# Patient Record
Sex: MALE | Race: WHITE | NOT HISPANIC OR LATINO | Employment: STUDENT | URBAN - METROPOLITAN AREA
[De-identification: names, ages, dates, MRNs, and addresses within clinical notes are randomized per-mention and may not be internally consistent; named-entity substitution may affect disease eponyms.]

---

## 2023-06-08 ENCOUNTER — APPOINTMENT (OUTPATIENT)
Dept: PRIMARY CARE | Facility: CLINIC | Age: 23
End: 2023-06-08

## 2023-06-12 ENCOUNTER — OFFICE VISIT (OUTPATIENT)
Dept: PRIMARY CARE | Facility: CLINIC | Age: 23
End: 2023-06-12
Payer: COMMERCIAL

## 2023-06-12 VITALS — WEIGHT: 205.5 LBS | DIASTOLIC BLOOD PRESSURE: 65 MMHG | SYSTOLIC BLOOD PRESSURE: 105 MMHG

## 2023-06-12 DIAGNOSIS — M51.9 LUMBAR DISC DISEASE: ICD-10-CM

## 2023-06-12 DIAGNOSIS — Z00.00 HEALTH CARE MAINTENANCE: Primary | ICD-10-CM

## 2023-06-12 PROCEDURE — 99395 PREV VISIT EST AGE 18-39: CPT | Performed by: INTERNAL MEDICINE

## 2023-06-18 NOTE — PROGRESS NOTES
Subjective   Patient ID: Toro Abbasi III is a 22 y.o. male who presents for No chief complaint on file..    HPI CPE see updated front sheet no chest pain no shortness of breath finishing his masters [employer] to working from home moving out doing well overall no regular medication bowels normal no dysuria allergies have been okay low back occasionally flares with his disc disease but not disabling still able to play golf    Past medical history lumbar disc disease    Medications none noted at this time    Allergies noted and unchanged    Social history no tobacco alcohol social    Family history noted and unchanged    Prevention exercises at least 2-3 times per week no recent previous blood work has had coronavirus infection at least 3 times per him no recent vaccines    Review of Systems    Objective   /65   Wt 93.2 kg (205 lb 8 oz)     Physical Exam vital signs noted alert and oriented x3 NCAT PERRLA EOMI nares without discharge OP benign TM normal bilateral EAC clear bilateral no AC nodes no JVD or bruit no thyromegaly chest clear to auscultation and percussion CV regular rate and rhythm S1-S2 without murmur gallop or rub abdomen soft nontender normal active bowel sounds no rebound or guarding no HSM LS spine normal to straighten curvature lumbar some tightness to the right posterior paraspinal muscle negative straight leg raise negative logroll negative SI joint tenderness extremities no clubbing cyanosis or edema normal distal pulses DTR 2+    Assessment/Plan    impression General medical examination lumbar disc disease  Plan back hygiene back stretches good hydration heating pad when needed occasional analgesia back stretches after playing golf no longer plays as much baseball no blood work desired or needed care and safety in the home in the home good overall diet continued exercise increase water consumption recheck at any time and for next follow-up visit TT 50 cc 26

## 2024-01-17 ENCOUNTER — OFFICE VISIT (OUTPATIENT)
Dept: PRIMARY CARE | Facility: CLINIC | Age: 24
End: 2024-01-17
Payer: COMMERCIAL

## 2024-01-17 VITALS — SYSTOLIC BLOOD PRESSURE: 125 MMHG | WEIGHT: 210.5 LBS | DIASTOLIC BLOOD PRESSURE: 68 MMHG

## 2024-01-17 DIAGNOSIS — M54.50 BILATERAL LOW BACK PAIN WITHOUT SCIATICA, UNSPECIFIED CHRONICITY: Primary | ICD-10-CM

## 2024-01-17 DIAGNOSIS — Z00.00 HEALTH CARE MAINTENANCE: ICD-10-CM

## 2024-01-17 DIAGNOSIS — M54.50 LOW BACK PAIN, UNSPECIFIED BACK PAIN LATERALITY, UNSPECIFIED CHRONICITY, UNSPECIFIED WHETHER SCIATICA PRESENT: ICD-10-CM

## 2024-01-17 PROCEDURE — 99395 PREV VISIT EST AGE 18-39: CPT | Performed by: INTERNAL MEDICINE

## 2024-01-17 NOTE — PROGRESS NOTES
Subjective   Patient ID: Toro Abbasi III is a 23 y.o. male who presents for No chief complaint on file..    HPI CPE and/or follow-up see updated front sheet after hours no staff no chest pain no shortness of breath has had some longer standing low back pain is sometimes will have for the goes numb at the top supplements thighs and even in the genital area that after a brief moment he will get all sensation back and will have a burning sensation has had no motor issues no falls no new injuries    Sitting perhaps doing some or exercises that uses his lower back and legs sinuses been okay no dysuria no incontinence bowels normal    History of back pain    Medications noted and unchanged none at the current time    Allergies noted and unchanged    Social history no tobacco alcohol rare social    Family history noted and unchanged    Prevention Works out regularly no recent evaluations no flu shot    Review of Systems    Objective   There were no vitals taken for this visit.    Physical Exam  Vital signs noted alert and oriented x 3 NCAT PERRLA EOMI nares without discharge OP benign TM normal bilateral EAC clear bilateral no AC nodes no JVD or bruit no thyromegaly chest clear to auscultation and percussion CV regular rate and rhythm S1-S2 LS-spine normal curvature nontender spinous process negative straight leg raise negative logroll negative SI joint tenderness extremities no clubbing cyanosis or edema normal distal pulses DTR 2+ on the left patella and 1+ on the right patella  Assessment/Plan    impression General medical examination if needed did not want to repeat any blood work  Plan will obtain LS-spine film AP lateral requisition made    Next current time may follow-up with orthopedics or surgery after advanced imaging or physical therapy if there are any other issues that come to report for immediate evaluation good hydration back hygiene back stretches avoiding gymnasium workouts with heavy spine compromise  such as dead lifting or overhead press and will recheck based on above TT 50 cc 26

## 2024-01-20 ENCOUNTER — HOSPITAL ENCOUNTER (OUTPATIENT)
Dept: RADIOLOGY | Facility: HOSPITAL | Age: 24
Discharge: HOME | End: 2024-01-20
Payer: COMMERCIAL

## 2024-01-20 DIAGNOSIS — M54.50 BILATERAL LOW BACK PAIN WITHOUT SCIATICA, UNSPECIFIED CHRONICITY: ICD-10-CM

## 2024-01-20 PROCEDURE — 72110 X-RAY EXAM L-2 SPINE 4/>VWS: CPT

## 2024-01-20 PROCEDURE — 72110 X-RAY EXAM L-2 SPINE 4/>VWS: CPT | Performed by: STUDENT IN AN ORGANIZED HEALTH CARE EDUCATION/TRAINING PROGRAM

## 2024-01-24 ENCOUNTER — TELEPHONE (OUTPATIENT)
Dept: PRIMARY CARE | Facility: CLINIC | Age: 24
End: 2024-01-24

## 2024-02-04 DIAGNOSIS — M54.50 LOW BACK PAIN, UNSPECIFIED BACK PAIN LATERALITY, UNSPECIFIED CHRONICITY, UNSPECIFIED WHETHER SCIATICA PRESENT: Primary | ICD-10-CM

## 2024-02-04 DIAGNOSIS — M51.9 LUMBAR DISC DISEASE: ICD-10-CM

## 2024-02-04 DIAGNOSIS — M54.50 BILATERAL LOW BACK PAIN WITHOUT SCIATICA, UNSPECIFIED CHRONICITY: ICD-10-CM

## 2024-02-19 ENCOUNTER — TRANSCRIBE ORDERS (OUTPATIENT)
Dept: ORTHOPEDIC SURGERY | Facility: HOSPITAL | Age: 24
End: 2024-02-19
Payer: COMMERCIAL

## 2024-02-19 DIAGNOSIS — M54.50 LOW BACK PAIN, UNSPECIFIED BACK PAIN LATERALITY, UNSPECIFIED CHRONICITY, UNSPECIFIED WHETHER SCIATICA PRESENT: ICD-10-CM

## 2024-02-20 ENCOUNTER — OFFICE VISIT (OUTPATIENT)
Dept: ORTHOPEDIC SURGERY | Facility: CLINIC | Age: 24
End: 2024-02-20
Payer: COMMERCIAL

## 2024-02-20 ENCOUNTER — APPOINTMENT (OUTPATIENT)
Dept: RADIOLOGY | Facility: CLINIC | Age: 24
End: 2024-02-20
Payer: COMMERCIAL

## 2024-02-20 VITALS — HEIGHT: 75 IN | BODY MASS INDEX: 26.11 KG/M2 | WEIGHT: 210 LBS

## 2024-02-20 DIAGNOSIS — M54.16 LUMBAR RADICULOPATHY: Primary | ICD-10-CM

## 2024-02-20 DIAGNOSIS — M51.36 DDD (DEGENERATIVE DISC DISEASE), LUMBAR: ICD-10-CM

## 2024-02-20 PROCEDURE — 99203 OFFICE O/P NEW LOW 30 MIN: CPT | Performed by: ORTHOPAEDIC SURGERY

## 2024-02-20 PROCEDURE — 99213 OFFICE O/P EST LOW 20 MIN: CPT | Performed by: ORTHOPAEDIC SURGERY

## 2024-02-20 ASSESSMENT — PAIN - FUNCTIONAL ASSESSMENT: PAIN_FUNCTIONAL_ASSESSMENT: NO/DENIES PAIN

## 2024-02-20 NOTE — LETTER
February 20, 2024     Niko Collins MD  [address]  [facility], [facility] [address]  [facility] [address]    Patient: Toro Abbasi III   YOB: 2000   Date of Visit: 2/20/2024       Dear Dr. Niko Collins MD:    Thank you for referring Toro Abbasi to me for evaluation. Below are my notes for this consultation.  If you have questions, please do not hesitate to call me. I look forward to following your patient along with you.       Sincerely,     Rob Sullivan MD      CC: No Recipients  ______________________________________________________________________________________    HPI:oTro Abbasi III is a 23-year-old man, who comes in today, with complaints of some intermittent numbness in his legs.  He notices that when he is sitting for prolonged period of time, i.e. when he is using the restroom, he will get some numbness into his legs bilaterally.  Patient has no radicular pain.  He is otherwise asymptomatic throughout the day.  He exercises on a regular basis with no significant pain.      ROS:  Reviewed on EMR and patient intake sheet.    PMH/SH:  Reviewed on EMR and patient intake sheet.    Exam:  Physical Exam    Constitutional: Well appearing; no acute distress  Eyes: pupils are equal and round  Psych: normal affect  Respiratory: non-labored breathing  Cardiovascular: regular rate and rhythm  GI: non-distended abdomen  Musculoskeletal: no pain with range of motion of the hips bilaterally  Neurologic: [5]/5 strength in the lower extremities bilaterally]; [negative] straight leg raise; no clonus; negative babinski    Radiology:     MRI from 2019 was personally reviewed.  At that time he had a degenerative L5-S1 disc bulge with a left paracentral component touching the traversing S1 nerve root.  Otherwise no spinal stenosis.    Diagnosis:    Lumbar radiculopathy    Assessment and Plan:   23-year-old man with intermittent numbness and tingling into his legs which may actually be  related to mechanical compression of the sciatic nerve in a prolonged sitting position.  Alternatively, he could be getting some intermittent tingling from his L5-S1 disc.  In either case, the patient is otherwise asymptomatic.  He is exercising and doing activities as tolerated.  No further treatment required at this time.  However, should he develop radicular pain in the leg, then I be happy to see him back at that time.  He was appreciative for the conservative nonoperative approach and will follow-up as necessary.    The patient was in agreement with the plan. At the end of the visit today, the patient felt that all questions had been answered satisfactorily.  The patient was pleased with the visit and very appreciative for the care rendered.     Thank you very much for the kind referral.  It is a privilege, and a pleasure, to partner with you in the care of your patients.  I would be delighted to assist you with any further consultations as needed.          Rob Sullivan MD    Chief of Spine Surgery, [facility]  Director of Spine Service, [facility]  , Department of Orthopaedics  [facility] [employer] [facility]  [address] [place] [address]  P: [phone]    This note was dictated with voice recognition software.  It has not been proofread for grammatical errors, typographical mistakes or other semantic inconsistencies.

## 2024-02-20 NOTE — PROGRESS NOTES
HPI:Toro Abbasi III is a 23-year-old man, who comes in today, with complaints of some intermittent numbness in his legs.  He notices that when he is sitting for prolonged period of time, i.e. when he is using the restroom, he will get some numbness into his legs bilaterally.  Patient has no radicular pain.  He is otherwise asymptomatic throughout the day.  He exercises on a regular basis with no significant pain.      ROS:  Reviewed on EMR and patient intake sheet.    PMH/SH:  Reviewed on EMR and patient intake sheet.    Exam:  Physical Exam    Constitutional: Well appearing; no acute distress  Eyes: pupils are equal and round  Psych: normal affect  Respiratory: non-labored breathing  Cardiovascular: regular rate and rhythm  GI: non-distended abdomen  Musculoskeletal: no pain with range of motion of the hips bilaterally  Neurologic: [5]/5 strength in the lower extremities bilaterally]; [negative] straight leg raise; no clonus; negative babinski    Radiology:     MRI from 2019 was personally reviewed.  At that time he had a degenerative L5-S1 disc bulge with a left paracentral component touching the traversing S1 nerve root.  Otherwise no spinal stenosis.    Diagnosis:    Lumbar radiculopathy    Assessment and Plan:   23-year-old man with intermittent numbness and tingling into his legs which may actually be related to mechanical compression of the sciatic nerve in a prolonged sitting position.  Alternatively, he could be getting some intermittent tingling from his L5-S1 disc.  In either case, the patient is otherwise asymptomatic.  He is exercising and doing activities as tolerated.  No further treatment required at this time.  However, should he develop radicular pain in the leg, then I be happy to see him back at that time.  He was appreciative for the conservative nonoperative approach and will follow-up as necessary.    The patient was in agreement with the plan. At the end of the visit today, the patient  felt that all questions had been answered satisfactorily.  The patient was pleased with the visit and very appreciative for the care rendered.     Thank you very much for the kind referral.  It is a privilege, and a pleasure, to partner with you in the care of your patients.  I would be delighted to assist you with any further consultations as needed.          Rob Sullivan MD    Chief of Spine Surgery, [facility]  Director of Spine Service, [facility]  , Department of Orthopaedics  [facility] [employer]  [address] [place] [address]  P: [phone]    This note was dictated with voice recognition software.  It has not been proofread for grammatical errors, typographical mistakes or other semantic inconsistencies.

## 2024-07-15 ENCOUNTER — OFFICE VISIT (OUTPATIENT)
Dept: PRIMARY CARE | Facility: CLINIC | Age: 24
End: 2024-07-15
Payer: COMMERCIAL

## 2024-07-15 ENCOUNTER — LAB (OUTPATIENT)
Dept: LAB | Facility: LAB | Age: 24
End: 2024-07-15
Payer: COMMERCIAL

## 2024-07-15 VITALS — HEART RATE: 76 BPM | RESPIRATION RATE: 12 BRPM

## 2024-07-15 DIAGNOSIS — B37.49 CANDIDIASIS OF GENITALIA: ICD-10-CM

## 2024-07-15 DIAGNOSIS — R30.0 DYSURIA: Primary | ICD-10-CM

## 2024-07-15 DIAGNOSIS — R30.0 DYSURIA: ICD-10-CM

## 2024-07-15 LAB
APPEARANCE UR: CLEAR
BILIRUB UR STRIP.AUTO-MCNC: NEGATIVE MG/DL
COLOR UR: YELLOW
GLUCOSE UR STRIP.AUTO-MCNC: NORMAL MG/DL
KETONES UR STRIP.AUTO-MCNC: NEGATIVE MG/DL
LEUKOCYTE ESTERASE UR QL STRIP.AUTO: NEGATIVE
NITRITE UR QL STRIP.AUTO: NEGATIVE
PH UR STRIP.AUTO: 6.5 [PH]
PROT UR STRIP.AUTO-MCNC: NEGATIVE MG/DL
RBC # UR STRIP.AUTO: NEGATIVE /UL
SP GR UR STRIP.AUTO: 1.02
UROBILINOGEN UR STRIP.AUTO-MCNC: NORMAL MG/DL

## 2024-07-15 PROCEDURE — 81003 URINALYSIS AUTO W/O SCOPE: CPT

## 2024-07-15 PROCEDURE — 99213 OFFICE O/P EST LOW 20 MIN: CPT | Performed by: INTERNAL MEDICINE

## 2024-07-15 NOTE — PROGRESS NOTES
Subjective   Patient ID: Toro Abbasi III is a 23 y.o. male who presents for No chief complaint on file..    HPI   Follow-up visit and sick visit no chest pain no shortness of breath had some discomfort after intercourse no bruising there was no discharge has been monogamous with a single person and sometimes uses protection has had HPV per him vaccination completed no dysuria no abdominal discomfort no fever  Review of Systems    Objective   There were no vitals taken for this visit.  Vital signs noted alert and oriented x 3 NCAT no JVD chest clear to auscultation CV regular rate and rhythm S1-S2 abdomen soft nontender normal active bowel sounds no inguinal hernia no adenopathy normal penis no ecchymoses normal descended testicles extremities no clubbing cyanosis or edema normal distal pulses  Physical Exam    Assessment/Plan     Impression penile discomfort versus dysuria versus other versus mild Candida  Plan okay for Lotrimin externally x 3 days okay for urinalysis requisition made discussed with protection and position as part of the intercourse experience good overall diet regular exercise good water consumption recheck if any other issues

## 2024-11-26 ENCOUNTER — APPOINTMENT (OUTPATIENT)
Dept: PRIMARY CARE | Facility: CLINIC | Age: 24
End: 2024-11-26
Payer: COMMERCIAL

## 2024-11-26 VITALS — SYSTOLIC BLOOD PRESSURE: 109 MMHG | WEIGHT: 214 LBS | DIASTOLIC BLOOD PRESSURE: 68 MMHG | BODY MASS INDEX: 26.75 KG/M2

## 2024-11-26 DIAGNOSIS — R00.2 PALPITATIONS: ICD-10-CM

## 2024-11-26 DIAGNOSIS — Z00.00 HEALTH CARE MAINTENANCE: Primary | ICD-10-CM

## 2024-11-26 PROCEDURE — 99395 PREV VISIT EST AGE 18-39: CPT | Performed by: INTERNAL MEDICINE

## 2024-11-26 NOTE — PROGRESS NOTES
Subjective   Patient ID: Toro Abbasi III is a 24 y.o. male who presents for No chief complaint on file..    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    Physical Exam  Vital signs noted alert and oriented x 3 NCAT PERRLA EOMI nares without discharge OP benign TM normal bilateral EAC clear bilateral no AC nodes no JVD no thyromegaly chest clear to auscultation CV regular rate and rhythm S1-S2 without murmur gallop or rub extremities no clubbing cyanosis or edema normal distal pulses DTR 2+ no tremor  Assessment/Plan    impression General Medical examination palpitation versus anxiety other diagnoses  Plan check Chem-7 advised on glucose potassium and kidney function check CBC advised on blood count check lipid panel advised on cholesterol profile good diet regular exercise good water consumption limit caffeine or sip throughout the day rather than all at once good sleep habits he desires no medications but will look at his results and then recheck and may be addressed anxiety issue if becomes more apparent or there is any other physical changes recheck at any time TT 50 cc 26

## 2025-07-23 ENCOUNTER — TELEPHONE (OUTPATIENT)
Dept: PRIMARY CARE | Facility: CLINIC | Age: 25
End: 2025-07-23

## 2025-08-26 ENCOUNTER — APPOINTMENT (OUTPATIENT)
Dept: CARDIOLOGY | Facility: CLINIC | Age: 25
End: 2025-08-26
Payer: COMMERCIAL

## 2025-08-28 ENCOUNTER — OFFICE VISIT (OUTPATIENT)
Dept: CARDIOLOGY | Facility: CLINIC | Age: 25
End: 2025-08-28
Payer: COMMERCIAL

## 2025-08-28 VITALS
HEIGHT: 75 IN | SYSTOLIC BLOOD PRESSURE: 128 MMHG | WEIGHT: 225 LBS | OXYGEN SATURATION: 96 % | DIASTOLIC BLOOD PRESSURE: 73 MMHG | HEART RATE: 58 BPM | BODY MASS INDEX: 27.98 KG/M2

## 2025-08-28 DIAGNOSIS — R07.89 OTHER CHEST PAIN: Primary | ICD-10-CM

## 2025-08-28 DIAGNOSIS — R00.2 PALPITATIONS: ICD-10-CM

## 2025-08-28 PROCEDURE — 3008F BODY MASS INDEX DOCD: CPT

## 2025-08-28 PROCEDURE — 99202 OFFICE O/P NEW SF 15 MIN: CPT

## 2025-08-28 PROCEDURE — 1036F TOBACCO NON-USER: CPT

## 2025-08-28 PROCEDURE — 99204 OFFICE O/P NEW MOD 45 MIN: CPT

## 2025-08-28 RX ORDER — CETIRIZINE HYDROCHLORIDE 10 MG/1
TABLET, CHEWABLE ORAL DAILY
COMMUNITY

## 2025-08-28 ASSESSMENT — ENCOUNTER SYMPTOMS: BACK PAIN: 1

## 2025-08-29 LAB — TSH SERPL-ACNC: 1.3 MIU/L (ref 0.4–4.5)

## 2025-09-17 ENCOUNTER — APPOINTMENT (OUTPATIENT)
Dept: CARDIOLOGY | Facility: CLINIC | Age: 25
End: 2025-09-17
Payer: COMMERCIAL

## 2025-09-19 ENCOUNTER — APPOINTMENT (OUTPATIENT)
Dept: CARDIOLOGY | Facility: CLINIC | Age: 25
End: 2025-09-19
Payer: COMMERCIAL

## 2025-10-01 ENCOUNTER — APPOINTMENT (OUTPATIENT)
Dept: CARDIOLOGY | Facility: CLINIC | Age: 25
End: 2025-10-01
Payer: COMMERCIAL